# Patient Record
(demographics unavailable — no encounter records)

---

## 2025-03-06 NOTE — ASSESSMENT
[FreeTextEntry1] :  KATHLEEN COVARRUBIAS is a 28 year old patient with a history of nasal surgery x 3 and chronic rhinitis. She had been using nasal decongestant sprays during allergy season and when she has a cold. On exam she has narrow nasal aperatures, nasal congestion, and mild reflux changes.   PLAN  - Saline rinses  - Allergy medicine as needed  - She may consider repeat allergy testing - She was given literature for management of reflux.  - She may try Breathe Right Strips or nasal cones - I have advised her decongestant sprays could cause rebound congestion and recommended she switch to nasal steroid spray like Flonase.  - She will consult Dr. Washington regarding nasal reconstruction procedures. She may benefit from revision surgery.

## 2025-03-06 NOTE — PHYSICAL EXAM
[TextEntry] : PHYSICAL EXAM   General: The patient was alert, oriented and in no distress. Voice was clear.   Face: The patient had no facial asymmetry or mass. The skin was unremarkable.   Ears: Hearing normal to conversational voice External ears were normal without deformity. Ear canals were clear. No cerumen or inflammation Tympanic membranes were intact and normal. No perforation or effusion. mobile   Nose: The external nose had no significant deformity. There is left sided nasal ala fullness. the nostrils were small. There was no facial tenderness. On anterior rhinoscopy, the nasal mucosa was normal. The anterior septum was grossly midline. There were no visualized polyps, purulence  or masses.   Slight positive Crisp maneuver.    Oral cavity: Oral mucosa- normal. Oral and base of tongue- clear and without mass. Gingival and buccal mucosa- moist and without lesions. Palate- the palate moved well. There was no cleft palate. There appeared to be good salivary flow.  Oral cavity/oropharynx- no pus, erythema or mass There was no cleft palate.    Neck: The neck was symmetrical. The parotid and submandibular glands were normal without masses. The trachea was midline and there was no unusual crepitus. Thyroid was smooth and nontender and no masses were palpated. No masses   Lymphatics: Cervical adenopathy- none.    PROCEDURE:  FLEXIBLE LARYNGOSCOPY, NASAL ENDOSCOPY   Surgeon: Dr. Rolle Indication: chronic rhinitis, history of nasal surgery x 3,  inadequate exam on anterior rhinoscopy Anesthetic: Topical lidocaine and Afrin Procedure: The patient was placed in a sitting position.  Following application of the topical anesthetic and decongestant, exam was performed with a flexible telescope.  The scope was passed along the right nasal floor to the nasopharynx.  It was then passed into the region of the middle meatus, middle turbinate, and sphenoethmoid region.  An identical procedure was performed on the left side.  The following findings were noted:   Nasal mucosa:  edematous Septum: grossly midline   Right nasal cavity      Inferior turbinate:  hypertrophic      Middle turbinate: normal      Superior turbinate: normal      Inferior meatus: no pus, polyps or congestion      Middle meatus:  scant stringy drainage with no congestion. No polyps      Superior meatus:  no pus, polyps, or congestion      Sphenoethmoidal recess: no pus, polyps or congestion   Left nasal cavity - narrow      Inferior turbinate:  hypertrophic      Middle turbinate: normal      Superior turbinate: normal      Inferior meatus: no pus, polyps or congestion      Middle meatus: scant stringy drainage with no congestion. No polyps      Superior meatus:  no pus, polyps, or congestion      Sphenoethmoidal recess: no pus, polyps or congestion   Nasopharynx: no masses, choanae patent, no adenoid tissue   Base of tongue and vallecula: no masses or asymmetry Posterior pharyngeal wall: no masses. Hypopharynx: symmetrical. No masses Pyriform sinuses: no lesions or pooling of secretions Epiglottis: normal. No edema or lesions Aryepiglottic folds: normal. No lesions. True vocal cords: clear and mobile. No lesions. Airway patent False vocal cords: normal Ventricles: no masses. Arytenoids:  mild edema Interarytenoid area: no masses. mild edema Subglottis: normal. no masses

## 2025-03-06 NOTE — HISTORY OF PRESENT ILLNESS
[de-identified] :  KATHLEEN COVARRUBIAS is a 28 year old patient seen for nasal congestion. She has a history of 3 nasal surgeries (at , age 4, and age 17). 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 She said eventually her lips did develop. She is unsure what surgeries were performed besides the septoplasty that she had done when she was 17 at [facility]. She was able to breathe well for several years until about 2 years ago when she felt increased nasal obstruction and congestion. the left side is worse. The nasal congestion has worsened in the last 3 months affecting her quality of life. She has been using Zicam decongestant nasal spray for 1 week for a URI. She also has clear rhinorrhea bilaterally. She has not used nasal steroid sprays or saline rinses.   ENT history No history of recurrent sinus infections.  No history of nasal or head trauma.  She does have a history of allergies to dust and pollen. She takes an antihistamine as needed. During allergy season she would use Afrin nasal spray.  She denies a history of reflux.  She has not tried breathe right strips.

## 2025-03-19 NOTE — PHYSICAL EXAM
[TextEntry] : GEN: well nourished, well developed, no acute distress. FACE: symmetric and motion intact, Benitez 5  EYES: EOMI, pupils symmetric, normal conjunctiva, vision grossly intact EARS: bilateral auricles normal, TMs intact & well-aerated, hearing grossly intact EXT NOSE:  thick skin, good tip support with slight tip ptosis, slight leftward dorsal deviation in the midvault/tip area, tip deviated to the left, nostrils bilaterally are ptotic with external nasal valve collapse, thickened firbosis noted on left alar area  INT NOSE: Mucosa dry and edematous, slight rightward septum deviation, narrow external nasal valve w significant improvement on [place] maneuver in alar rim position, moderate turbinate hypertrophy ORAL CAVITY: normal dentition with baseline occlusion, mucus membranes healthy SKIN: intact, warm, and dry NEURO: alert & oriented x4

## 2025-03-19 NOTE — HISTORY OF PRESENT ILLNESS
[de-identified] : Ms. KATHLEEN COVARRUBIAS is a pleasant 28 year female presenting today as referral from Dr Rolle for nasal obstruction.   Pt reports h/o long-standing nasal obstruction, L>R. Symptoms have been present for their whole life. Pt reports she had some sort of nasal deformity as a child - her nose was very flattened and "dragged" to the left. She denies having cleft lip or palate. Pt underwent at least two reconstructive surgeries for the nose one at 3yo and one at 18yo at [facility]. She denies ear or rib donor site. She continues to endorse nasal obstruction that has worsened in recent years. They have tried nasal steroid sprays in the past for 4 consecutive weeks without significant improvement. Currently using Afrin nasal sprays which help but she is using it every day. They have not tried nasal dilator strips/nasal cones. They endorse h/o allergies; has yet to get allergy tested but planning to in the future. Denies h/o sinus infections. Denies h/o nasal trauma.Pt was evaluated by Dr Rolle and nasal endoscopy was completed at that time revealing edematous nasal mucosa. Aesthetically, she is bothered by nostril asymmetry and dorsum asymmetry.   Past medical/surgical history unremarkable otherwise. Nonsmoker. Not on anticoagulation. Here by herself.

## 2025-03-19 NOTE — ASSESSMENT
[FreeTextEntry1] : .  Plan: - Discussed that patient needs to stop using topical nasal decongestants prior to surgery as it can potentially compromise nasal healing. She will switch to Flonase. - Patient has significant nasal obstruction refractory to maximal medical therapy and structural anatomic abnormalities that would benefit from nasal airway surgery. This would comprise revision rhinoplasty with nasal valve repair in order to rebuild the structural support of her nose. Given history of prior nasal surgery, we discussed potential need for cartilage harvest from outside the nose, most likely the rib vs ear. We also had a long discussion regarding the increased risk rhinoplasty following multiple revision surgery including potential for skin necrosis given compromised blood supply. We discussed that given the revision nature of the procedure, our goal would be for improvement as we are limited by scarring and prior surgeries.  - Discussed in detail the benefits, alternatives, and risks of this procedure which include, but are not limited to, infection, bleeding, scarring, change in appearance, septal perforation, continued nasal obstruction, and need for revision surgery. - The patient reports understanding of these risks, the proposed benefits, and alternatives and wishes to proceed.  We will initiate the authorization/scheduling process.  -- Nancy Washington MD Facial Plastic & Reconstructive Surgery

## 2025-06-25 NOTE — REASON FOR VISIT
[de-identified] : open nasal airway surgery, autologous rib harvest [de-identified] : 6/19/25 [de-identified] : Pt is doing well since surgery. Pain is well-controlled at this point on just Tylenol/Advil. She has been doing the mupirocin ointment and nasal saline sprays as prescribed.   Exam: Dorsum moderate swelling as expected Transcolumellar incision well-healing, sutures removed Left intranasal incisions well-healed Septum intact & midline, nasal airway widely patent Nasal tip well-supported Rib cartilage donor site well-healing with dermabond   . Plan: - Mupirocin & nasal saline sprays as prescribed. - Nasal precautions reviewed. - Recommended taping the nose and left-sided xeroform dressing to prevent nostril restenosis - RTC 1 month   -- Nancy Washington MD Facial Plastic & Reconstructive Surgery

## 2025-07-23 NOTE — REASON FOR VISIT
[de-identified] : open nasal airway surgery, autologous rib harvest [de-identified] : 6/19/25 [de-identified] : Pt is doing well since surgery. She has been doing the Xeroform nostril bolster as instructed. She has been doing the mupirocin ointment and nasal saline sprays as prescribed. She is very happy with the appearance of the nose. Also notes improvement in nasal breathing. She does note some pain on both sides of her nasal bones but this is not too bothersome.    Exam: Dorsum moderate swelling as expected Transcolumellar incision well-healing Left intranasal incisions well-healed - slight tightening of left nostril but overall appears to not be restenosing Septum intact & midline, nasal airway widely patent Nasal tip well-supported Rib cartilage donor site well-healing    . Plan: - Nasal saline sprays as prescribed. - Nasal precautions reviewed. - Continue taping the nose and left-sided xeroform dressing to prevent nostril restenosis - RTC 2 months   -- Nancy Washington MD Facial Plastic & Reconstructive Surgery